# Patient Record
Sex: FEMALE | Race: WHITE | NOT HISPANIC OR LATINO | URBAN - METROPOLITAN AREA
[De-identification: names, ages, dates, MRNs, and addresses within clinical notes are randomized per-mention and may not be internally consistent; named-entity substitution may affect disease eponyms.]

---

## 2017-03-14 RX ORDER — TOPIRAMATE 50 MG/1
TABLET, FILM COATED ORAL
Qty: 420 TABLET | Refills: 0 | OUTPATIENT
Start: 2017-03-14

## 2017-03-17 RX ORDER — TOPIRAMATE 50 MG/1
TABLET, FILM COATED ORAL
Qty: 60 TABLET | Refills: 0 | Status: SHIPPED | OUTPATIENT
Start: 2017-03-17 | End: 2017-03-20 | Stop reason: SDUPTHER

## 2017-03-20 ENCOUNTER — OFFICE VISIT (OUTPATIENT)
Dept: FAMILY MEDICINE CLINIC | Facility: CLINIC | Age: 37
End: 2017-03-20

## 2017-03-20 VITALS
OXYGEN SATURATION: 99 % | BODY MASS INDEX: 22.18 KG/M2 | WEIGHT: 138 LBS | SYSTOLIC BLOOD PRESSURE: 120 MMHG | TEMPERATURE: 98.2 F | HEART RATE: 55 BPM | DIASTOLIC BLOOD PRESSURE: 82 MMHG | HEIGHT: 66 IN

## 2017-03-20 DIAGNOSIS — G43.109 MIGRAINE WITH AURA AND WITHOUT STATUS MIGRAINOSUS, NOT INTRACTABLE: Primary | ICD-10-CM

## 2017-03-20 PROCEDURE — 99213 OFFICE O/P EST LOW 20 MIN: CPT | Performed by: FAMILY MEDICINE

## 2017-03-20 RX ORDER — TOPIRAMATE 50 MG/1
50 TABLET, FILM COATED ORAL 2 TIMES DAILY
Qty: 60 TABLET | Refills: 11 | Status: SHIPPED | OUTPATIENT
Start: 2017-03-20 | End: 2018-04-18 | Stop reason: SDUPTHER

## 2017-03-20 RX ORDER — TOPIRAMATE 50 MG/1
50 TABLET, FILM COATED ORAL 2 TIMES DAILY
COMMUNITY
End: 2017-03-20

## 2017-03-20 RX ORDER — LEVOTHYROXINE SODIUM 112 UG/1
112 TABLET ORAL DAILY
COMMUNITY
Start: 2017-02-08

## 2017-03-20 NOTE — PROGRESS NOTES
Subjective   Andree Solis is a 37 y.o. female who is here for   Chief Complaint   Patient presents with   • Med Refill   • Migraine   .     History of Present Illness   Migraines are doing very well  0 headaches on the Topamax  No SE  Finished her pregnancy surrogacy  Off breast feeding  Sees riki for her thyroid  Utd at gyn        The following portions of the patient's history were reviewed and updated as appropriate: allergies, current medications, past family history, past medical history, past social history and problem list.    Review of Systems    Objective   Physical Exam   Constitutional: She appears well-developed and well-nourished. No distress.   Eyes: Conjunctivae are normal.   Cardiovascular: Normal rate.    Pulmonary/Chest: Effort normal.   Neurological: She is alert. No cranial nerve deficit.   Psychiatric: She has a normal mood and affect.   Nursing note and vitals reviewed.      Assessment/Plan   Andree was seen today for med refill and migraine.    Diagnoses and all orders for this visit:    Migraine with aura and without status migrainosus, not intractable  -     topiramate (TOPAMAX) 50 MG tablet; Take 1 tablet by mouth 2 (Two) Times a Day.      There are no Patient Instructions on file for this visit.    Medications Discontinued During This Encounter   Medication Reason   • topiramate (TOPAMAX) 50 MG tablet    • benzonatate (TESSALON) 200 MG capsule    • guaifenesin-codeine (CHERATUSSIN AC) 100-10 MG/5ML liquid    • ibuprofen (ADVIL,MOTRIN) 800 MG tablet    • levothyroxine (SYNTHROID, LEVOTHROID) 75 MCG tablet    • Multiple Vitamins-Minerals (MULTIVITAL) tablet    • SYNTHROID 125 MCG tablet    • SYNTHROID 137 MCG tablet    • ZOLMitriptan (ZOMIG) 5 MG tablet    • topiramate (TOPAMAX) 50 MG tablet Reorder        Return in about 1 year (around 3/20/2018).    Dr. Teddy Ramyundo  [facility] [place], [place].

## 2018-04-18 DIAGNOSIS — G43.109 MIGRAINE WITH AURA AND WITHOUT STATUS MIGRAINOSUS, NOT INTRACTABLE: ICD-10-CM

## 2018-04-18 RX ORDER — TOPIRAMATE 50 MG/1
TABLET, FILM COATED ORAL
Qty: 60 TABLET | Refills: 0 | Status: SHIPPED | OUTPATIENT
Start: 2018-04-18